# Patient Record
Sex: MALE | Race: WHITE | Employment: FULL TIME | URBAN - NONMETROPOLITAN AREA
[De-identification: names, ages, dates, MRNs, and addresses within clinical notes are randomized per-mention and may not be internally consistent; named-entity substitution may affect disease eponyms.]

---

## 2022-03-11 ENCOUNTER — OFFICE VISIT (OUTPATIENT)
Dept: PRIMARY CARE CLINIC | Age: 57
End: 2022-03-11

## 2022-03-11 VITALS — HEART RATE: 82 BPM

## 2022-03-11 DIAGNOSIS — M79.642 HAND PAIN, NOT ARTHRALGIA, LEFT: Primary | ICD-10-CM

## 2022-03-11 ASSESSMENT — ENCOUNTER SYMPTOMS
RESPIRATORY NEGATIVE: 1
COLOR CHANGE: 0

## 2022-03-11 NOTE — PROGRESS NOTES
[place]  Landon Stovall [id number] [facility],[address] [facility]  Dept: [phone]  Loc: [phone]    Alejandro [place] (:  1965) is a 61 y.o. [facility] patient, here for evaluation of the following chief complaint(s):  No chief complaint on file. ASSESSMENT/PLAN:  1. Hand pain, not arthralgia, left   pad the palm of the hand to avoid direct pressure using force to push the screwdriver with the palm  Try massage, ice massage   With rotation of OTC  pain medications as discussed    follow up in 2 weeks to evaluate or sooner if symptoms worsen. Positive Phalens with tingling in fingers 1-4 on left hand, negative tinel's. Will attempt to have Safety order padded glive.  strength:  Right 92.2  Left 99.0 when compared to pre hire physical    left 110  Right 85.8    Return in about 2 weeks (around 3/25/2022). 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 For this role he is using screw  with his left hand placing direct pressure middle of the palm with exertion and force to screw in window guards repetitively with his assembly role  He states he is doing this while standing on a raised platform and has difficulty with having leverage to use other body parts to give him an area to push off, and finds the force is mainly focused in the palm of left hand and with  of left hand. He is left hand dominant. This has progressed for past few weeks, he was given some  type glove with some light gel but reports this is not enough padding and has also attempted to place some padding on the end of the screwdriver. He reports tingling sensations extend up medial forearm, worse after work and at night. States improves on weekends and flares once he comes back to work. Hand Pain   The incident occurred more than 1 week ago. The incident occurred at work. The injury mechanism was repetitive motion. The pain is present in the left hand. The quality of the pain is described as aching and shooting. The pain radiates to the left arm. The pain is at a severity of 3/10. The pain is mild. The pain has been fluctuating since the incident. Associated symptoms include tingling. Pertinent negatives include no chest pain, muscle weakness or numbness. The symptoms are aggravated by movement and palpation. He has tried NSAIDs for the symptoms. The treatment provided mild relief. Review of Systems   Constitutional: Negative. Respiratory: Negative. Cardiovascular: Negative for chest pain. Musculoskeletal: Positive for arthralgias and myalgias. Skin: Negative for color change, pallor, rash and wound. Neurological: Positive for tingling and weakness. Negative for tremors and numbness. Tingling in hand forearm       Physical Exam  Constitutional:       Appearance: Normal appearance. Cardiovascular:      Rate and Rhythm: Normal rate. Pulses: Normal pulses. Musculoskeletal:         General: Tenderness present. No deformity or signs of injury. Right wrist: Normal.      Left wrist: Normal.      Right hand: Normal.      Left hand: Tenderness present. No swelling. Decreased strength. Normal strength of finger abduction, thumb/finger opposition and wrist extension. Decreased sensation of the median distribution. There is no disruption of two-point discrimination. Normal capillary refill. Normal pulse. Skin:     General: Skin is warm and dry. Capillary Refill: Capillary refill takes 2 to 3 seconds. Findings: No bruising or erythema. Neurological:      Mental Status: He is alert and oriented to person, place, and time. Additional Information:    There were no vitals taken for this visit.     An electronic signature was used to

## 2022-03-11 NOTE — PATIENT INSTRUCTIONS
Try to utilize gauze or wrap to avoid direct pressure to the middle palm  Some mild stretching with ice massage 3 times a day to alleviate discomfort  Follow up in 10-14 days to see f symptoms improve  As needed:  Tylenol 650 mg po every 6 hours as needed rotate  With Ibuprofen 600 mg three times a day as needed monitor for GI effects, tke with food.